# Patient Record
Sex: MALE | URBAN - METROPOLITAN AREA
[De-identification: names, ages, dates, MRNs, and addresses within clinical notes are randomized per-mention and may not be internally consistent; named-entity substitution may affect disease eponyms.]

---

## 2018-08-22 ENCOUNTER — OUTPATIENT (OUTPATIENT)
Dept: OUTPATIENT SERVICES | Facility: HOSPITAL | Age: 28
LOS: 1 days | End: 2018-08-22
Payer: COMMERCIAL

## 2018-08-22 VITALS
TEMPERATURE: 98 F | SYSTOLIC BLOOD PRESSURE: 139 MMHG | RESPIRATION RATE: 18 BRPM | HEIGHT: 70 IN | OXYGEN SATURATION: 98 % | HEART RATE: 72 BPM | WEIGHT: 313.94 LBS | DIASTOLIC BLOOD PRESSURE: 83 MMHG

## 2018-08-22 DIAGNOSIS — M25.571 PAIN IN RIGHT ANKLE AND JOINTS OF RIGHT FOOT: ICD-10-CM

## 2018-08-22 DIAGNOSIS — G47.33 OBSTRUCTIVE SLEEP APNEA (ADULT) (PEDIATRIC): ICD-10-CM

## 2018-08-22 DIAGNOSIS — Z01.818 ENCOUNTER FOR OTHER PREPROCEDURAL EXAMINATION: ICD-10-CM

## 2018-08-22 DIAGNOSIS — K21.9 GASTRO-ESOPHAGEAL REFLUX DISEASE WITHOUT ESOPHAGITIS: ICD-10-CM

## 2018-08-22 DIAGNOSIS — Z90.49 ACQUIRED ABSENCE OF OTHER SPECIFIED PARTS OF DIGESTIVE TRACT: Chronic | ICD-10-CM

## 2018-08-22 PROCEDURE — G0463: CPT

## 2018-08-22 NOTE — H&P PST ADULT - HISTORY OF PRESENT ILLNESS
27 yr old male with PMH of AN on CPAP, GERD, obesity presents with c/o right foot pain due to injury 27 yr old male with PMH of AN on CPAP, GERD, obesity presents with c/o right foot pain due to injury. Reports worsening of pain with ambulation. Pt for right foot arthrotomy of intertarsal joint on 8/27/2018.

## 2018-08-22 NOTE — H&P PST ADULT - RS GEN PE MLT RESP DETAILS PC
good air movement/breath sounds equal/airway patent/no rales/no intercostal retractions/no chest wall tenderness/no wheezes/no rhonchi/no subcutaneous emphysema/normal/respirations non-labored/clear to auscultation bilaterally

## 2018-08-22 NOTE — H&P PST ADULT - PROBLEM SELECTOR PLAN 3
Continue use of Omeprazole and take with sips of water on day of surgery.  Follow-up with PCP postop for management

## 2018-08-22 NOTE — H&P PST ADULT - NEGATIVE CARDIOVASCULAR SYMPTOMS
no dyspnea on exertion/no peripheral edema/no chest pain/no claudication/no palpitations/no orthopnea/no paroxysmal nocturnal dyspnea

## 2018-08-22 NOTE — H&P PST ADULT - ASSESSMENT
27 yr old male with PMH of AN on CPAP, GERD, obesity presents with pain in right ankle and joints of right foot. Pt for right foot arthrotomy of intertarsal joint on 8/27/2018.

## 2018-08-22 NOTE — H&P PST ADULT - PMH
AN on CPAP GERD (gastroesophageal reflux disease)    Obesity    AN on CPAP    Pain in right ankle and joints of right foot

## 2018-08-22 NOTE — H&P PST ADULT - FAMILY HISTORY
Father  Still living? Unknown  Family history of hypertension, Age at diagnosis: Age Unknown     Mother  Still living? Yes, Estimated age: Age Unknown  Family history of hypertension, Age at diagnosis: Age Unknown  Family history of diabetes mellitus (DM), Age at diagnosis: Age Unknown

## 2018-08-27 ENCOUNTER — OUTPATIENT (OUTPATIENT)
Dept: OUTPATIENT SERVICES | Facility: HOSPITAL | Age: 28
LOS: 1 days | End: 2018-08-27
Payer: COMMERCIAL

## 2018-08-27 VITALS
OXYGEN SATURATION: 100 % | WEIGHT: 313.94 LBS | TEMPERATURE: 98 F | SYSTOLIC BLOOD PRESSURE: 134 MMHG | RESPIRATION RATE: 16 BRPM | DIASTOLIC BLOOD PRESSURE: 80 MMHG | HEART RATE: 86 BPM | HEIGHT: 70 IN

## 2018-08-27 VITALS
RESPIRATION RATE: 12 BRPM | HEART RATE: 77 BPM | DIASTOLIC BLOOD PRESSURE: 77 MMHG | SYSTOLIC BLOOD PRESSURE: 104 MMHG | TEMPERATURE: 98 F | OXYGEN SATURATION: 100 %

## 2018-08-27 DIAGNOSIS — M25.571 PAIN IN RIGHT ANKLE AND JOINTS OF RIGHT FOOT: ICD-10-CM

## 2018-08-27 DIAGNOSIS — Z90.49 ACQUIRED ABSENCE OF OTHER SPECIFIED PARTS OF DIGESTIVE TRACT: Chronic | ICD-10-CM

## 2018-08-27 PROBLEM — E66.9 OBESITY, UNSPECIFIED: Chronic | Status: ACTIVE | Noted: 2018-08-22

## 2018-08-27 PROBLEM — K21.9 GASTRO-ESOPHAGEAL REFLUX DISEASE WITHOUT ESOPHAGITIS: Chronic | Status: ACTIVE | Noted: 2018-08-22

## 2018-08-27 PROBLEM — G47.33 OBSTRUCTIVE SLEEP APNEA (ADULT) (PEDIATRIC): Chronic | Status: ACTIVE | Noted: 2018-08-22

## 2018-08-27 PROCEDURE — C1889: CPT

## 2018-08-27 PROCEDURE — 27635 REMOVE LOWER LEG BONE LESION: CPT | Mod: RT

## 2018-08-27 PROCEDURE — 27695 REPAIR OF ANKLE LIGAMENT: CPT | Mod: RT

## 2018-08-27 RX ORDER — FLUTICASONE PROPIONATE 220 MCG
1 AEROSOL WITH ADAPTER (GRAM) INHALATION
Qty: 0 | Refills: 0 | COMMUNITY

## 2018-08-27 RX ORDER — AZELASTINE 137 UG/1
1 SPRAY, METERED NASAL
Qty: 0 | Refills: 0 | COMMUNITY

## 2018-08-27 RX ORDER — OMEPRAZOLE 10 MG/1
1 CAPSULE, DELAYED RELEASE ORAL
Qty: 0 | Refills: 0 | COMMUNITY

## 2018-08-27 RX ORDER — ACETAMINOPHEN 500 MG
2 TABLET ORAL
Qty: 0 | Refills: 0 | COMMUNITY

## 2018-08-27 NOTE — ASU DISCHARGE PLAN (ADULT/PEDIATRIC). - MEDICATION SUMMARY - MEDICATIONS TO TAKE
I will START or STAY ON the medications listed below when I get home from the hospital:    Tylenol 325 mg oral tablet  -- 2 tab(s) by mouth every 4 hours, As Needed  -- Indication: For per PCP    oxyCODONE-acetaminophen 5 mg-325 mg oral tablet  -- 1 tab(s) by mouth every 8 hours MDD:3  -- Caution federal law prohibits the transfer of this drug to any person other  than the person for whom it was prescribed.  May cause drowsiness.  Alcohol may intensify this effect.  Use care when operating dangerous machinery.  This prescription cannot be refilled.  This product contains acetaminophen.  Do not use  with any other product containing acetaminophen to prevent possible liver damage.  Using more of this medication than prescribed may cause serious breathing problems.    -- Indication: For Right ankle pain    Naprosyn 500 mg oral tablet  -- 1 tab(s) by mouth 2 times a day MDD:2  -- Check with your doctor before becoming pregnant.  May cause drowsiness or dizziness.  Obtain medical advice before taking any non-prescription drugs as some may affect the action of this medication.  Take with food or milk.    -- Indication: For Right ankle pain    azelastine 137 mcg/inh (0.1%) nasal spray  -- 1 inhaler(s) into nose 2 times a day  -- Indication: For per PCP    omeprazole 40 mg oral delayed release capsule  -- 1 cap(s) by mouth once a day  -- Indication: For per PCP    fluticasone 50 mcg/inh inhalation powder  -- 1 puff(s) inhaled 2 times a day  -- Indication: For per PCP

## 2018-08-27 NOTE — ASU DISCHARGE PLAN (ADULT/PEDIATRIC). - NOTIFY
Swelling that continues/Numbness, tingling/Bleeding that does not stop/Numbness, color, or temperature change to extremity/Pain not relieved by Medications/Fever greater than 101

## 2018-08-27 NOTE — ASU PATIENT PROFILE, ADULT - PMH
GERD (gastroesophageal reflux disease)    Obesity    AN on CPAP    Pain in right ankle and joints of right foot

## 2018-08-30 LAB — SURGICAL PATHOLOGY STUDY: SIGNIFICANT CHANGE UP

## 2022-10-02 ENCOUNTER — EMERGENCY (EMERGENCY)
Facility: HOSPITAL | Age: 32
LOS: 1 days | Discharge: HOME | End: 2022-10-02
Attending: EMERGENCY MEDICINE | Admitting: EMERGENCY MEDICINE

## 2022-10-02 VITALS
RESPIRATION RATE: 16 BRPM | TEMPERATURE: 100 F | OXYGEN SATURATION: 97 % | DIASTOLIC BLOOD PRESSURE: 79 MMHG | HEART RATE: 102 BPM | SYSTOLIC BLOOD PRESSURE: 138 MMHG

## 2022-10-02 VITALS
RESPIRATION RATE: 18 BRPM | HEART RATE: 124 BPM | SYSTOLIC BLOOD PRESSURE: 143 MMHG | TEMPERATURE: 103 F | OXYGEN SATURATION: 97 % | HEIGHT: 70 IN | DIASTOLIC BLOOD PRESSURE: 72 MMHG

## 2022-10-02 DIAGNOSIS — Z90.49 ACQUIRED ABSENCE OF OTHER SPECIFIED PARTS OF DIGESTIVE TRACT: Chronic | ICD-10-CM

## 2022-10-02 LAB
ALBUMIN SERPL ELPH-MCNC: 4.4 G/DL — SIGNIFICANT CHANGE UP (ref 3.3–5)
ALP SERPL-CCNC: 44 U/L — SIGNIFICANT CHANGE UP (ref 40–120)
ALT FLD-CCNC: 13 U/L — SIGNIFICANT CHANGE UP (ref 4–41)
ANION GAP SERPL CALC-SCNC: 12 MMOL/L — SIGNIFICANT CHANGE UP (ref 7–14)
ANISOCYTOSIS BLD QL: SLIGHT — SIGNIFICANT CHANGE UP
AST SERPL-CCNC: 17 U/L — SIGNIFICANT CHANGE UP (ref 4–40)
B PERT DNA SPEC QL NAA+PROBE: SIGNIFICANT CHANGE UP
B PERT+PARAPERT DNA PNL SPEC NAA+PROBE: SIGNIFICANT CHANGE UP
BASOPHILS # BLD AUTO: 0 K/UL — SIGNIFICANT CHANGE UP (ref 0–0.2)
BASOPHILS NFR BLD AUTO: 0 % — SIGNIFICANT CHANGE UP (ref 0–2)
BILIRUB SERPL-MCNC: 0.4 MG/DL — SIGNIFICANT CHANGE UP (ref 0.2–1.2)
BORDETELLA PARAPERTUSSIS (RAPRVP): SIGNIFICANT CHANGE UP
BUN SERPL-MCNC: 9 MG/DL — SIGNIFICANT CHANGE UP (ref 7–23)
C PNEUM DNA SPEC QL NAA+PROBE: SIGNIFICANT CHANGE UP
CALCIUM SERPL-MCNC: 9.2 MG/DL — SIGNIFICANT CHANGE UP (ref 8.4–10.5)
CHLORIDE SERPL-SCNC: 95 MMOL/L — LOW (ref 98–107)
CO2 SERPL-SCNC: 26 MMOL/L — SIGNIFICANT CHANGE UP (ref 22–31)
CREAT SERPL-MCNC: 1.17 MG/DL — SIGNIFICANT CHANGE UP (ref 0.5–1.3)
EGFR: 84 ML/MIN/1.73M2 — SIGNIFICANT CHANGE UP
EOSINOPHIL # BLD AUTO: 0 K/UL — SIGNIFICANT CHANGE UP (ref 0–0.5)
EOSINOPHIL NFR BLD AUTO: 0 % — SIGNIFICANT CHANGE UP (ref 0–6)
FLUAV SUBTYP SPEC NAA+PROBE: SIGNIFICANT CHANGE UP
FLUBV RNA SPEC QL NAA+PROBE: SIGNIFICANT CHANGE UP
GLUCOSE SERPL-MCNC: 126 MG/DL — HIGH (ref 70–99)
HADV DNA SPEC QL NAA+PROBE: SIGNIFICANT CHANGE UP
HCOV 229E RNA SPEC QL NAA+PROBE: SIGNIFICANT CHANGE UP
HCOV HKU1 RNA SPEC QL NAA+PROBE: SIGNIFICANT CHANGE UP
HCOV NL63 RNA SPEC QL NAA+PROBE: SIGNIFICANT CHANGE UP
HCOV OC43 RNA SPEC QL NAA+PROBE: SIGNIFICANT CHANGE UP
HCT VFR BLD CALC: 39.6 % — SIGNIFICANT CHANGE UP (ref 39–50)
HETEROPH AB TITR SER AGGL: POSITIVE
HGB BLD-MCNC: 13.4 G/DL — SIGNIFICANT CHANGE UP (ref 13–17)
HIV 1+2 AB+HIV1 P24 AG SERPL QL IA: SIGNIFICANT CHANGE UP
HMPV RNA SPEC QL NAA+PROBE: SIGNIFICANT CHANGE UP
HPIV1 RNA SPEC QL NAA+PROBE: SIGNIFICANT CHANGE UP
HPIV2 RNA SPEC QL NAA+PROBE: SIGNIFICANT CHANGE UP
HPIV3 RNA SPEC QL NAA+PROBE: SIGNIFICANT CHANGE UP
HPIV4 RNA SPEC QL NAA+PROBE: SIGNIFICANT CHANGE UP
IANC: 6.51 K/UL — SIGNIFICANT CHANGE UP (ref 1.8–7.4)
LYMPHOCYTES # BLD AUTO: 1.08 K/UL — SIGNIFICANT CHANGE UP (ref 1–3.3)
LYMPHOCYTES # BLD AUTO: 12.2 % — LOW (ref 13–44)
M PNEUMO DNA SPEC QL NAA+PROBE: SIGNIFICANT CHANGE UP
MAGNESIUM SERPL-MCNC: 2.2 MG/DL — SIGNIFICANT CHANGE UP (ref 1.6–2.6)
MCHC RBC-ENTMCNC: 24.6 PG — LOW (ref 27–34)
MCHC RBC-ENTMCNC: 33.8 GM/DL — SIGNIFICANT CHANGE UP (ref 32–36)
MCV RBC AUTO: 72.8 FL — LOW (ref 80–100)
MICROCYTES BLD QL: SLIGHT — SIGNIFICANT CHANGE UP
MONOCYTES # BLD AUTO: 0.69 K/UL — SIGNIFICANT CHANGE UP (ref 0–0.9)
MONOCYTES NFR BLD AUTO: 7.8 % — SIGNIFICANT CHANGE UP (ref 2–14)
NEUTROPHILS # BLD AUTO: 6.57 K/UL — SIGNIFICANT CHANGE UP (ref 1.8–7.4)
NEUTROPHILS NFR BLD AUTO: 73.9 % — SIGNIFICANT CHANGE UP (ref 43–77)
PHOSPHATE SERPL-MCNC: 2.9 MG/DL — SIGNIFICANT CHANGE UP (ref 2.5–4.5)
PLAT MORPH BLD: NORMAL — SIGNIFICANT CHANGE UP
PLATELET # BLD AUTO: 232 K/UL — SIGNIFICANT CHANGE UP (ref 150–400)
PLATELET COUNT - ESTIMATE: NORMAL — SIGNIFICANT CHANGE UP
POTASSIUM SERPL-MCNC: 4.3 MMOL/L — SIGNIFICANT CHANGE UP (ref 3.5–5.3)
POTASSIUM SERPL-SCNC: 4.3 MMOL/L — SIGNIFICANT CHANGE UP (ref 3.5–5.3)
PROT SERPL-MCNC: 8.1 G/DL — SIGNIFICANT CHANGE UP (ref 6–8.3)
RAPID RVP RESULT: SIGNIFICANT CHANGE UP
RBC # BLD: 5.44 M/UL — SIGNIFICANT CHANGE UP (ref 4.2–5.8)
RBC # FLD: 15.2 % — HIGH (ref 10.3–14.5)
RBC BLD AUTO: NORMAL — SIGNIFICANT CHANGE UP
RSV RNA SPEC QL NAA+PROBE: SIGNIFICANT CHANGE UP
RV+EV RNA SPEC QL NAA+PROBE: SIGNIFICANT CHANGE UP
SARS-COV-2 RNA SPEC QL NAA+PROBE: SIGNIFICANT CHANGE UP
SODIUM SERPL-SCNC: 133 MMOL/L — LOW (ref 135–145)
VARIANT LYMPHS # BLD: 6.1 % — HIGH (ref 0–6)
WBC # BLD: 8.89 K/UL — SIGNIFICANT CHANGE UP (ref 3.8–10.5)
WBC # FLD AUTO: 8.89 K/UL — SIGNIFICANT CHANGE UP (ref 3.8–10.5)

## 2022-10-02 PROCEDURE — 93010 ELECTROCARDIOGRAM REPORT: CPT

## 2022-10-02 PROCEDURE — 99284 EMERGENCY DEPT VISIT MOD MDM: CPT

## 2022-10-02 PROCEDURE — 71045 X-RAY EXAM CHEST 1 VIEW: CPT | Mod: 26

## 2022-10-02 RX ORDER — ACETAMINOPHEN 500 MG
975 TABLET ORAL ONCE
Refills: 0 | Status: COMPLETED | OUTPATIENT
Start: 2022-10-02 | End: 2022-10-02

## 2022-10-02 RX ORDER — BENZOCAINE AND MENTHOL 5; 1 G/100ML; G/100ML
1 LIQUID ORAL ONCE
Refills: 0 | Status: COMPLETED | OUTPATIENT
Start: 2022-10-02 | End: 2022-10-02

## 2022-10-02 RX ORDER — SODIUM CHLORIDE 9 MG/ML
1000 INJECTION INTRAMUSCULAR; INTRAVENOUS; SUBCUTANEOUS ONCE
Refills: 0 | Status: COMPLETED | OUTPATIENT
Start: 2022-10-02 | End: 2022-10-02

## 2022-10-02 RX ORDER — KETOROLAC TROMETHAMINE 30 MG/ML
15 SYRINGE (ML) INJECTION ONCE
Refills: 0 | Status: DISCONTINUED | OUTPATIENT
Start: 2022-10-02 | End: 2022-10-02

## 2022-10-02 RX ADMIN — Medication 975 MILLIGRAM(S): at 11:43

## 2022-10-02 RX ADMIN — BENZOCAINE AND MENTHOL 1 LOZENGE: 5; 1 LIQUID ORAL at 12:26

## 2022-10-02 RX ADMIN — Medication 975 MILLIGRAM(S): at 12:30

## 2022-10-02 RX ADMIN — Medication 15 MILLIGRAM(S): at 11:43

## 2022-10-02 RX ADMIN — Medication 15 MILLIGRAM(S): at 12:30

## 2022-10-02 RX ADMIN — SODIUM CHLORIDE 1000 MILLILITER(S): 9 INJECTION INTRAMUSCULAR; INTRAVENOUS; SUBCUTANEOUS at 11:52

## 2022-10-02 NOTE — ED ADULT NURSE NOTE - CAS TRG GENERAL NORM CIRC DET
Strong peripheral pulses/Capillary refill less/equal to 2 seconds/Bounding peripheral pulses Strong peripheral pulses/Capillary refill less/equal to 2 seconds

## 2022-10-02 NOTE — ED ADULT NURSE NOTE - OBJECTIVE STATEMENT
Patient received in stretcher. AOX4. respirations even and unlabored. Presents to ED C/o HA, sore throat and fever since Friday worsening yesterday. + nausea Patient denies sick contacts, CP, SOB, V/D. Reports taking OTC medication with minimal relief of s/s. Patient reports ability to tolerate PO solids and liquids. Pending MD rodas. Comfort and safety maintained. Will continue to monitor Patricia DELVALLE

## 2022-10-02 NOTE — ED PROVIDER NOTE - OBJECTIVE STATEMENT
32-year-old male with a past medical history of AN, GERD presents to the ED with 4-day history of body aches, chills, and sore throat.  Patient states his constellation of symptoms started 4 days ago on Thursday and progressively worsened.  Patient endorses poor p.o. intake.  He denies any nausea or vomiting.  No other sick contacts at home.  Up-to-date on vaccinations.  He denies any urinary complaints, headaches, focal neurologic deficits, numbness, or tingling.  No exacerbating or alleviating factors.  Patient endorses 1 episode of near syncope at home.  No family history of early cardiac events or early cardiac deaths.  No history of arrhythmia.  He denies any other symptoms at bedside. Sexually active with 1 partner, no hx of STD.

## 2022-10-02 NOTE — ED PROVIDER NOTE - PATIENT PORTAL LINK FT
You can access the FollowMyHealth Patient Portal offered by [facility] by registering at the following website: [email or web address]. By joining [facility]’s FollowMyHealth portal, you will also be able to view your health information using other applications (apps) compatible with our system.

## 2022-10-02 NOTE — ED PROVIDER NOTE - ATTENDING CONTRIBUTION TO CARE
Otherwise healthy male presents to the ED with 3 to 4 days of fevers, chills, sore throat, headache. Patient is well-appearing, noted to be tachycardic in setting of fever resolves, now heart rate of patient not taking anything today at home such as Tylenol or ibuprofen.  On exam, lungs grossly clear. He has bilateral tonsillar enlargement, erythema with exudates. Labs, RVP, fluids, mono, strep culture.

## 2022-10-02 NOTE — ED PROVIDER NOTE - NSICDXPASTMEDICALHX_GEN_ALL_CORE_FT
PAST MEDICAL HISTORY:  GERD (gastroesophageal reflux disease)     Obesity     AN on CPAP     Pain in right ankle and joints of right foot

## 2022-10-02 NOTE — ED PROVIDER NOTE - PHYSICAL EXAMINATION
GENERAL: no acute distress, non-toxic appearing, elevated BMI  HEAD: normocephalic, atraumatic  HEENT: normal conjunctiva, oral mucosa moist, (+) cervical lymphadenopathy, no tonsillar exudates  CARDIAC: tachycardiac  PULM: clear to ascultation bilaterally, no respiratory distress  GI: abdomen nondistended, soft, nontender, no guarding or rebound tenderness  : no CVA tenderness, no suprapubic tenderness  NEURO: alert and oriented x 3, normal speech  MSK: no visible deformities, no peripheral edema, calf tenderness/redness/swelling  SKIN: no visible rashes, dry, well-perfused  PSYCH: appropriate mood and affect GENERAL: no acute distress, non-toxic appearing, elevated BMI  HEAD: normocephalic, atraumatic  HEENT: normal conjunctiva, oral mucosa moist, (+) cervical lymphadenopathy  CARDIAC: tachycardiac  PULM: clear to ascultation bilaterally, no respiratory distress  GI: abdomen nondistended, soft, nontender, no guarding or rebound tenderness  : no CVA tenderness, no suprapubic tenderness  NEURO: alert and oriented x 3, normal speech  MSK: no visible deformities, no peripheral edema, calf tenderness/redness/swelling  SKIN: no visible rashes, dry, well-perfused  PSYCH: appropriate mood and affect

## 2022-10-02 NOTE — ED PROVIDER NOTE - PROGRESS NOTE DETAILS
Mil Hess MD (PGY-2): mono test positive. remaining lab work unremarkable, chest xray clear. will d/c w/ precautions for EBV, information packet given and verbal instructions discussed. follow up with family medicine within 1-2 weeks for re-assessment and follow up. Ibuprofen and Tylenol for symptom control. All questions answered. Pt happy and agreeable w/ the plan of care. Mil Hess MD (PGY-2): mono test positive. remaining lab work unremarkable, chest xray clear. HR normalized after IVF, 95 bpm upon re-assessment. will d/c w/ precautions for EBV, information packet given and verbal instructions discussed. follow up with family medicine within 1-2 weeks for re-assessment and follow up. Ibuprofen and Tylenol for symptom control. All questions answered. Pt happy and agreeable w/ the plan of care.

## 2022-10-02 NOTE — ED PROVIDER NOTE - NSFOLLOWUPINSTRUCTIONS_ED_ALL_ED_FT
You were diagnosed with mononucleosis.     Follow up with the Family Medicine Clinic in 1-2 weeks. You will be called to make an appointment. Call the Emergency Department if you have difficulties getting your appointment.    DO NOT PARTICIPATE IN SPORTS UNTIL YOU ARE CLEARED BY YOUR DOCTOR. THIS CAN LEAD TO LIFE THREATENING DAMAGE TO YOUR SPLEEN. See the attached information packet for more details.     Immediately return to the Emergency Department for any new or markedly worsening symptoms.    Alternate between Tylenol and Ibuprofen. Take 1 g of Tylenol, 4 hours later take 600 mg of Ibuprofen, 4 hours after this take 1 g of Tylenol. Continuing alternating like this as needed for pain. If you do not have pain, you do not need to take this medication.      Use over counter ibuprofen and tylenol for management of your pain. Use only as needed. Read the instructions on the medication container carefully. Follow these instructions when using these medications. These medications can be dangerous when used incorrectly. Possible consequences include liver and kidney disease. These medications are very safe when used correctly.

## 2022-10-02 NOTE — ED PROVIDER NOTE - CLINICAL SUMMARY MEDICAL DECISION MAKING FREE TEXT BOX
32-year-old male with past medical history of AN, GERD presents to the ED with a constellation of body aches, chills, and sore throat.  Progressively worsened over 4 days ago.  Initial vitals notable for fevers and tachycardia, rest of vitals otherwise nonactionable.  Tylenol and IV hydration ordered.  Physical exam as noted above.  Notable for cervical lymphadenopathy tender to palpation.  No tonsillar exudates noted.  Euvolemic on exam.  Tolerating secretions without difficulty.  No respiratory distress.  Differential diagnosis includes but not limited to viral syndrome versus strep/EBV versus metabolic derangements.  Will order labs, EKG, meds, imaging, reassess. 32-year-old male with past medical history of AN, GERD presents to the ED with a constellation of body aches, chills, and sore throat.  Progressively worsened over 4 days ago.  Initial vitals notable for fevers and tachycardia, rest of vitals otherwise nonactionable.  Tylenol and IV hydration ordered.  Physical exam as noted above.  Notable for cervical lymphadenopathy tender to palpation.  Euvolemic on exam.  Tolerating secretions without difficulty.  No respiratory distress.  Differential diagnosis includes but not limited to viral syndrome versus strep/EBV versus metabolic derangements.  Will order labs, EKG, meds, imaging, reassess.

## 2022-10-02 NOTE — ED PROVIDER NOTE - NSICDXFAMILYHX_GEN_ALL_CORE_FT
FAMILY HISTORY:  Father  Still living? Unknown  Family history of hypertension, Age at diagnosis: Age Unknown    Mother  Still living? Yes, Estimated age: Age Unknown  Family history of diabetes mellitus (DM), Age at diagnosis: Age Unknown  Family history of hypertension, Age at diagnosis: Age Unknown

## 2022-10-03 LAB
CULTURE RESULTS: SIGNIFICANT CHANGE UP
SPECIMEN SOURCE: SIGNIFICANT CHANGE UP

## 2024-11-18 NOTE — ED ADULT TRIAGE NOTE - BRAND OF COVID-19 VACCINATION
medicine, you may start with regular or extra-strength Tylenol. If you need more than Tylenol you can also take an anti-inflammatory medication such as Advil, Aleve, or Motrin in addition to the Tylenol.  Typically an opiate is also prescribed for breakthrough pain as needed.    Do not resume taking Fosamax for 8 weeks after your fusion surgery.  To help alleviate persistent soreness around the incision and muscles, apply ice or warm moist compresses.  It is common to have some increased nerve pain after surgery for weeks to sometimes months after neck or back surgery. This should resolve with time as your heal and the inflammation from surgery resolves.   If you need a refill on your pain medication, please note our office is closed over the weekend. It is our office policy that on-call providers DO NOT refill narcotic pain medications over the weekend. If you will need a refill over the weekend, please call our office by Thursday.    Driving  You may NOT drive a car until told otherwise by your physician. You may be a passenger for short distances (about 20-30 minutes.) If you must take a longer trip, be sure to make several pit stops so that you can walk and stretch your legs. Reclining in the passenger seat seems to be the most comfortable position for most patients.    Follow-Up Appointments  When you are discharged from the hospital, a follow up appointment will be made for 2-3 weeks from your surgery date. [facility] or call [phone] to confirm your follow-up appointment.    Medication Interaction  During your procedure you potentially received a medication or medications which may reduce the effectiveness of oral contraceptives. Please consider other forms of contraception for 1 month following your procedure if you are currently using oral contraceptives as your primary form of birth control. In addition to this, we recommend continuing your oral contraceptive as prescribed, unless otherwise  instructed by your physician, during this time.    After general anesthesia, for 24 hours or while taking prescription Narcotics  Limit your activities  A responsible adult needs to be with you for the next 24 hours  Do not drive and operate hazardous machinery  Do not make important personal or business decisions  Do not drink alcoholic beverages  If you have not urinated within 8 hours after discharge, and you are experiencing discomfort from urinary retention, please go to the nearest ED.  If you have sleep apnea and have a CPAP machine, please use it for all naps and sleeping.  Please use caution when taking narcotics and any of your home medications that may cause drowsiness.    Anticoagulant Medications  Anticoagulant medications may be resumed on the third day (or 72 hours) after spinal surgery.      IF YOU HAVE QUESTIONS ONCE YOU ARE AT HOME CALL THE FOLLOWING NUMBERS:   Main office number: [phone]    For emergency call [phone]        [employer] dose 1, 2, and 3